# Patient Record
Sex: FEMALE | Race: WHITE | NOT HISPANIC OR LATINO | Employment: UNEMPLOYED | URBAN - METROPOLITAN AREA
[De-identification: names, ages, dates, MRNs, and addresses within clinical notes are randomized per-mention and may not be internally consistent; named-entity substitution may affect disease eponyms.]

---
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
## 2024-07-10 ENCOUNTER — OFFICE VISIT (OUTPATIENT)
Dept: SPORTS MEDICINE | Facility: CLINIC | Age: 19
End: 2024-07-10
Payer: COMMERCIAL

## 2024-07-10 VITALS
DIASTOLIC BLOOD PRESSURE: 62 MMHG | HEIGHT: 68 IN | TEMPERATURE: 98 F | BODY MASS INDEX: 20.46 KG/M2 | OXYGEN SATURATION: 99 % | SYSTOLIC BLOOD PRESSURE: 112 MMHG | HEART RATE: 68 BPM | WEIGHT: 135 LBS | RESPIRATION RATE: 16 BRPM

## 2024-07-10 DIAGNOSIS — M54.50 ACUTE MIDLINE LOW BACK PAIN WITHOUT SCIATICA: Primary | ICD-10-CM

## 2024-07-10 DIAGNOSIS — Q76.49 SACRALIZATION OF LUMBAR VERTEBRA: ICD-10-CM

## 2024-07-10 PROCEDURE — 99204 OFFICE O/P NEW MOD 45 MIN: CPT | Performed by: FAMILY MEDICINE

## 2024-07-10 RX ORDER — CELECOXIB 200 MG/1
200 CAPSULE ORAL DAILY
Qty: 30 CAPSULE | Refills: 0 | Status: SHIPPED | OUTPATIENT
Start: 2024-07-10

## 2024-07-10 RX ORDER — BUPROPION HYDROCHLORIDE 300 MG/1
300 TABLET ORAL DAILY
COMMUNITY

## 2024-07-10 NOTE — PROGRESS NOTES
"Jane is a 18 y.o. year old female presents to [facility]    Chief Complaint   Patient presents with    Back Pain     Lower back for several weeks, KNI. States she has pain in bilateral hips at times.        History of Present Illness  History of Present Illness  The patient presents for evaluation of back pain. She is accompanied by her father.    The patient has been experiencing persistent back pain for several weeks, characterized by severe spinal and hip pain. She describes a sensation akin to a squishing sensation at the base of her back. She has sought chiropractic treatment every few months, with the most recent visit occurring approximately 2 weeks ago. Previous attempts at physical therapy were unsuccessful due to concurrent endometriosis-related pain, necessitating surgical intervention. The patient's current back pain differs from her previous endometriosis condition. The chiropractor has identified a potential transitional vertebra. The pain is intermittent, with occasional radiation down the anterior aspect of her hip. 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 She has not attempted decompression, traction, or inversion table treatments. She has attempted to manage the pain with ice. Over-the-counter anti-inflammatories have not been effective. She has not undergone an MRI. Previous attempts to manage the pain with meloxicam were unsuccessful due to adverse reactions. She has intermittently used over-the-counter ibuprofen 600 mg.    I have reviewed the patient's medical, family, and social history in detail and updated the computerized patient record.    /62 (BP Location: Right arm, Patient Position: Sitting, Cuff Size: Adult)   Pulse 68   Temp 98 °F (36.7 °C)   Resp 16   Ht 172.7 cm (68\")   Wt 61.2 kg (135 lb)   SpO2 99%   BMI 20.53 kg/m²      Physical " Exam    Vital signs reviewed.   General: No acute distress.  Eyes: conjunctiva clear; pupils equally round and reactive  ENT: external ears atraumatic  CV: no peripheral edema  Resp: normal respiratory effort, no use of accessory muscles  Skin: no rashes or wounds; normal turgor  Psych: mood and affect appropriate; recent and remote memory intact  Neuro: sensation to light touch intact    MSK Exam  Physical Exam  Musculoskeletal system shows point tenderness of the lumbar spine along bilateral lower lumbar regions near the sacroiliac notch. Negative straight leg raise bilateral. Negative HERBERT and FADIR bilateral. Negative Stinchfield bilateral. 2+ DTR L4-S1 bilateral.    Lumbar Spine X-Ray  Indication: Pain  Views: AP and Lateral    Findings:  No fracture  No bony lesion  Normal soft tissues  Normal disc spaces  Dextroscoliosis of lumbar spine  There does appear to be sacralization of L5 vertebra    No prior studies were available for comparison.        Results  Imaging  X-ray shows a transitional vertebrae.         Diagnoses and all orders for this visit:    Acute midline low back pain without sciatica  -     XR Spine Lumbar 2 or 3 View  -     Ambulatory Referral to Physical Therapy for Evaluation & Treatment  -     celecoxib (CeleBREX) 200 MG capsule; Take 1 capsule by mouth Daily.    Sacralization of lumbar vertebra    Other orders  -     buPROPion XL (WELLBUTRIN XL) 300 MG 24 hr tablet; Take 1 tablet by mouth Daily.      Assessment & Plan  1. Lower lumbar pain.  The patient's x-ray suggests a possible transitional vertebra. However, the x-ray does not reveal any alarming findings. The tenderness she is experiencing is likely due to inflammation in the back and the muscles. A prescription for Celebrex 200 mg, to be taken once daily for a duration of 2 weeks, has been issued. Additionally, physical therapy has been recommended.    Follow-up  A follow-up appointment is scheduled for 6 weeks from  now.          Follow Up     Patient was given instructions and counseling regarding her condition or for health maintenance advice. Please see specific information pulled into the AVS if appropriate.     Patient or patient representative verbalized consent for the use of Ambient Listening during the visit with  WILBER Katz Jr., DO for chart documentation. 7/10/2024  16:33 EDT

## 2024-07-11 ENCOUNTER — PATIENT ROUNDING (BHMG ONLY) (OUTPATIENT)
Dept: SPORTS MEDICINE | Facility: CLINIC | Age: 19
End: 2024-07-11
Payer: COMMERCIAL

## 2024-07-11 NOTE — PROGRESS NOTES
July 11, 2024      A [employer] Message has been sent to the patient for PATIENT ROUNDING with [facility]

## 2024-07-15 ENCOUNTER — TELEPHONE (OUTPATIENT)
Dept: SPORTS MEDICINE | Facility: CLINIC | Age: 19
End: 2024-07-15
Payer: COMMERCIAL

## 2024-07-15 NOTE — TELEPHONE ENCOUNTER
Patients father Phan seymour. He states that this prescription needs a PA for Insurance. He sates that [email or web address] has faxed over form for PA.     celecoxib (CeleBREX) 200 MG capsule     Please advise.

## 2024-07-16 NOTE — TELEPHONE ENCOUNTER
Outgoing call to the patient and informed RX was approved, pharmacy to reprocess and dispense.     Thanks  [place]

## 2024-08-20 ENCOUNTER — TREATMENT (OUTPATIENT)
Dept: PHYSICAL THERAPY | Facility: CLINIC | Age: 19
End: 2024-08-20
Payer: COMMERCIAL

## 2024-08-20 DIAGNOSIS — G89.29 CHRONIC LOW BACK PAIN, UNSPECIFIED BACK PAIN LATERALITY, UNSPECIFIED WHETHER SCIATICA PRESENT: Primary | ICD-10-CM

## 2024-08-20 DIAGNOSIS — M54.50 CHRONIC LOW BACK PAIN, UNSPECIFIED BACK PAIN LATERALITY, UNSPECIFIED WHETHER SCIATICA PRESENT: Primary | ICD-10-CM

## 2024-08-20 NOTE — PROGRESS NOTES
Physical Therapy Initial Evaluation and Plan of Care        Patient: Jane Cervantes   : 2005  Diagnosis/ICD-10 Code:  Chronic low back pain, unspecified back pain laterality, unspecified whether sciatica present [M54.50, G89.29]  Referring practitioner: Gallo Katz *  Date of Initial Visit: 2024  Today's Date: 2024  Patient seen for 1 sessions           Subjective Questionnaire: Oswestry:       Subjective Evaluation    History of Present Illness  Mechanism of injury: Jane is a 19 year old female who presents with low back pain. She reports L side is worse. She normally sleeps on belly or back. She denies any radiating symptoms. She gets some clicking and popping in her hips, L>R. She has the worst pain sitting still or standing still. She was prescribed celebrex but it's brand new and she hasn't noticed much of a difference yet. She rides horses regularly and that doesn't bother her at all. Xray revealed mild rotary scoliosis with apex at L2.      Patient Occupation: college student Pain  Current pain ratin  At worst pain ratin  Quality: dull ache  Relieving factors: ice  Aggravating factors: prolonged positioning    Hand dominance: right    Treatments  Previous treatment: physical therapy  Current treatment: physical therapy  Patient Goals  Patient goals for therapy: decreased pain, improved balance, increased motion, independence with ADLs/IADLs and return to sport/leisure activities             Objective          Static Posture     Lumbar Spine   Increased lordosis.     Pelvis   Anterior pelvic tilt    Active Range of Motion     Lumbar   Flexion: 10 degrees   Extension: 5 degrees with pain    Strength/Myotome Testing     Left Hip   Planes of Motion   Flexion: 4    Right Hip   Planes of Motion   Flexion: 4    Left Knee   Flexion: 5  Extension: 5    Right Knee   Flexion: 5  Extension: 5    Left Ankle/Foot   Dorsiflexion: 5    Right Ankle/Foot   Dorsiflexion:  5    Functional Assessment   Squat   Pain, left tibial anterior translation beyond toes and right tibial anterior translation beyond toes.     Comments  Poor hip and ankle mobility, patient unable to perform a full squat.     General Comments     Lumbar Comments  Several hypomobile lumbar spine segments, worse L3-L5.         Assessment & Plan       Assessment  Impairments: abnormal gait, abnormal muscle firing, abnormal or restricted ROM, activity intolerance, impaired physical strength, lacks appropriate home exercise program and pain with function   Functional limitations: carrying objects, lifting, walking, pulling, pushing, moving in bed, standing, stooping and unable to perform repetitive tasks   Assessment details: Pt presents with the following limitations: limited lumbar ROM, decreased strength, altered posture, nerve tension, pain with ADLs. Pt's signs and symptoms are consistent with referring diagnosis. Patient would benefit from additional skilled therapy to address the impairments listed above in order to return to prior level of functioning with no functional limitations.  Prognosis: good    Goals  Plan Goals: Short Term Goals (achieve in 4 weeks):  1. Pt will be independent with HEP  2. Pt will decrease current pain level to 4/10.  3. Pt will exhibit improved lumbar spine joint mobility as measured by prone PA's.  4. Pt will exhibit no tenderness to palpation  Long Term Goals (achieve in 6-8 weeks):  5. Pt will decrease GUSTABO score by 10 points to show decreased self-reported limitations.  6. Pt will report worst pain decreased to 6/10.  7. Pt will increase lumbar flexion ROM to 45 degrees.  8. Pt will increase hip flexion strength to 4+/5 to show increased functional strength.      Plan  Therapy options: will be seen for skilled therapy services  Planned modality interventions: cryotherapy, TENS, traction and ultrasound  Planned therapy interventions: abdominal trunk stabilization, manual therapy, ADL  retraining, balance/weight-bearing training, flexibility, functional ROM exercises, gait training, home exercise program, spinal/joint mobilization, strengthening, stretching, therapeutic activities, soft tissue mobilization, neuromuscular re-education, IADL retraining, postural training, joint mobilization, transfer training, motor coordination training and body mechanics training  Frequency: 2x week  Duration in weeks: 8  Treatment plan discussed with: patient        Visit Diagnoses:    ICD-10-CM ICD-9-CM   1. Chronic low back pain, unspecified back pain laterality, unspecified whether sciatica present  M54.50 724.2    G89.29 338.29       Timed:  Manual Therapy:         mins  54398;  Therapeutic Exercise:    10     mins  86734;     Neuromuscular Russell:        mins  68072;    Therapeutic Activity:     15     mins  03239;     Gait Training:           mins  67334;     Ultrasound:          mins  82059;    Electrical Stimulation:         mins  31866 ( );    Untimed:  Electrical Stimulation:         mins  36939 ( );  Mechanical Traction:         mins  34839;     Timed Treatment:   25   mins   Total Treatment:     45   mins    PT SIGNATURE: Ismael Henriquez PT, DPT, OCS  DATE TREATMENT INITIATED: 8/20/2024      Initial Certification  Certification Period: 11/18/2024  I certify that the therapy services are furnished while this patient is under my care.  The services outlined above are required by this patient, and will be reviewed every 90 days.     PHYSICIAN: Gallo Katz Jr., DO      DATE:     Please sign and return via fax to [phone].. Thank you, [facility].

## 2024-09-04 ENCOUNTER — TREATMENT (OUTPATIENT)
Dept: PHYSICAL THERAPY | Facility: CLINIC | Age: 19
End: 2024-09-04
Payer: COMMERCIAL

## 2024-09-04 DIAGNOSIS — M54.50 CHRONIC LOW BACK PAIN, UNSPECIFIED BACK PAIN LATERALITY, UNSPECIFIED WHETHER SCIATICA PRESENT: Primary | ICD-10-CM

## 2024-09-04 DIAGNOSIS — G89.29 CHRONIC LOW BACK PAIN, UNSPECIFIED BACK PAIN LATERALITY, UNSPECIFIED WHETHER SCIATICA PRESENT: Primary | ICD-10-CM

## 2024-09-04 NOTE — PROGRESS NOTES
Physical Therapy Progress Note    [facility]  [address]  [address]  [place] [address]      Patient: Jane Cervantes   : 2005  Referring practitioner: Gallo Katz *  Date of Initial Visit: Type: THERAPY  Noted: 2024  Today's Date: 2024  Patient seen for 2 sessions         Jane Cervantes reports: no new complaints.        Subjective     Objective   See Exercise, Manual, and Modality Logs for complete treatment.       Assessment/Plan  Jane exhibits R hip flexor and piriformis flexibility restrictions. She is TTP at her L PSIS and exhibits decreased SI joint rotation on L side. HEP progressed with self mobilization for SI joint rotation.  Visit Diagnoses:    ICD-10-CM ICD-9-CM   1. Chronic low back pain, unspecified back pain laterality, unspecified whether sciatica present  M54.50 724.2    G89.29 338.29       Progress per Plan of Care           Timed:  Manual Therapy:    10     mins  87688;  Therapeutic Exercise:    10     mins  35022;     Neuromuscular Russell:    10    mins  28040;    Therapeutic Activity:     8     mins  57865;     Gait Training:           mins  48536;     Ultrasound:          mins  07710;    Electrical Stimulation:         mins  13354 ( );    Untimed:  Electrical Stimulation:         mins  27014 ( );  Mechanical Traction:         mins  13553;     Timed Treatment:   38   mins   Total Treatment:     38   mins  Ismael Henriquez PT, DPT, [facility]  Physical Therapist   [place] License #[id number]